# Patient Record
Sex: FEMALE | Race: WHITE | NOT HISPANIC OR LATINO | Employment: OTHER | URBAN - METROPOLITAN AREA
[De-identification: names, ages, dates, MRNs, and addresses within clinical notes are randomized per-mention and may not be internally consistent; named-entity substitution may affect disease eponyms.]

---

## 2022-04-14 ENCOUNTER — TELEPHONE (OUTPATIENT)
Dept: NEUROSURGERY | Facility: CLINIC | Age: 42
End: 2022-04-14

## 2022-04-14 NOTE — TELEPHONE ENCOUNTER
Referral order was received (2 pages). No other records were received. I have tried numerous times to call the referring office to request records.

## 2022-04-14 NOTE — TELEPHONE ENCOUNTER
Caller: Celena Puckett    Relationship to patient: Self    Best call back number: [phone]    Type of visit: NEW PATIENT    Requested date: NEXT AVAILABLE     If rescheduling, when is the original appointment: N/A     Additional notes: PATIENT IS CALLING TO CHECK STATUS OF REFERRAL. I ADVISED I WOULD VERIFY REFERRAL WAS FAXED BY DR DENNIS LEVIN'S OFFICE IN [place] ([phone]).    VERIFIED WITH REFERRING OFFICE THAT REFERRAL WAS FAXED [phone] ON 4/6/22. THEY ARE GOING TO RE-FAX TO MAKE SURE REFERRAL IS RECEIVED.

## 2022-08-03 ENCOUNTER — OFFICE VISIT (OUTPATIENT)
Dept: NEUROSURGERY | Facility: CLINIC | Age: 42
End: 2022-08-03

## 2022-08-03 ENCOUNTER — LAB (OUTPATIENT)
Dept: LAB | Facility: HOSPITAL | Age: 42
End: 2022-08-03

## 2022-08-03 VITALS — BODY MASS INDEX: 35.03 KG/M2 | WEIGHT: 218 LBS | HEIGHT: 66 IN | TEMPERATURE: 97.5 F

## 2022-08-03 DIAGNOSIS — M96.1 LUMBAR POSTLAMINECTOMY SYNDROME: ICD-10-CM

## 2022-08-03 DIAGNOSIS — S31.000A OPEN WOUND OF LUMBAR REGION: ICD-10-CM

## 2022-08-03 DIAGNOSIS — M54.14 THORACIC RADICULOPATHY: Primary | ICD-10-CM

## 2022-08-03 DIAGNOSIS — S31.000A OPEN WOUND OF LUMBAR REGION: Primary | ICD-10-CM

## 2022-08-03 LAB
BASOPHILS # BLD AUTO: 0.08 10*3/MM3 (ref 0–0.2)
BASOPHILS NFR BLD AUTO: 0.8 % (ref 0–1.5)
CRP SERPL-MCNC: 0.6 MG/DL (ref 0–0.5)
DEPRECATED RDW RBC AUTO: 42.3 FL (ref 37–54)
EOSINOPHIL # BLD AUTO: 0.1 10*3/MM3 (ref 0–0.4)
EOSINOPHIL NFR BLD AUTO: 1 % (ref 0.3–6.2)
ERYTHROCYTE [DISTWIDTH] IN BLOOD BY AUTOMATED COUNT: 13.2 % (ref 12.3–15.4)
ERYTHROCYTE [SEDIMENTATION RATE] IN BLOOD: 43 MM/HR (ref 0–20)
HCT VFR BLD AUTO: 41.8 % (ref 34–46.6)
HGB BLD-MCNC: 14 G/DL (ref 12–15.9)
IMM GRANULOCYTES # BLD AUTO: 0.04 10*3/MM3 (ref 0–0.05)
IMM GRANULOCYTES NFR BLD AUTO: 0.4 % (ref 0–0.5)
LYMPHOCYTES # BLD AUTO: 2.62 10*3/MM3 (ref 0.7–3.1)
LYMPHOCYTES NFR BLD AUTO: 25.7 % (ref 19.6–45.3)
MCH RBC QN AUTO: 29.4 PG (ref 26.6–33)
MCHC RBC AUTO-ENTMCNC: 33.5 G/DL (ref 31.5–35.7)
MCV RBC AUTO: 87.8 FL (ref 79–97)
MONOCYTES # BLD AUTO: 1.02 10*3/MM3 (ref 0.1–0.9)
MONOCYTES NFR BLD AUTO: 10 % (ref 5–12)
NEUTROPHILS NFR BLD AUTO: 6.32 10*3/MM3 (ref 1.7–7)
NEUTROPHILS NFR BLD AUTO: 62.1 % (ref 42.7–76)
NRBC BLD AUTO-RTO: 0 /100 WBC (ref 0–0.2)
PLATELET # BLD AUTO: 430 10*3/MM3 (ref 140–450)
PMV BLD AUTO: 8.6 FL (ref 6–12)
RBC # BLD AUTO: 4.76 10*6/MM3 (ref 3.77–5.28)
WBC NRBC COR # BLD: 10.18 10*3/MM3 (ref 3.4–10.8)

## 2022-08-03 PROCEDURE — 86140 C-REACTIVE PROTEIN: CPT

## 2022-08-03 PROCEDURE — 36415 COLL VENOUS BLD VENIPUNCTURE: CPT

## 2022-08-03 PROCEDURE — 85025 COMPLETE CBC W/AUTO DIFF WBC: CPT

## 2022-08-03 PROCEDURE — 85652 RBC SED RATE AUTOMATED: CPT

## 2022-08-03 PROCEDURE — 99204 OFFICE O/P NEW MOD 45 MIN: CPT | Performed by: NEUROLOGICAL SURGERY

## 2022-08-03 RX ORDER — DEXTROAMPHETAMINE SACCHARATE, AMPHETAMINE ASPARTATE MONOHYDRATE, DEXTROAMPHETAMINE SULFATE AND AMPHETAMINE SULFATE 7.5; 7.5; 7.5; 7.5 MG/1; MG/1; MG/1; MG/1
30 CAPSULE, EXTENDED RELEASE ORAL 2 TIMES DAILY
COMMUNITY

## 2022-08-03 RX ORDER — RISPERIDONE 0.5 MG/1
TABLET ORAL
COMMUNITY

## 2022-08-03 RX ORDER — GABAPENTIN 300 MG/1
CAPSULE ORAL
Qty: 90 CAPSULE | Refills: 0 | Status: SHIPPED | OUTPATIENT
Start: 2022-08-03

## 2022-08-03 RX ORDER — ESCITALOPRAM OXALATE 20 MG/1
20 TABLET ORAL DAILY
COMMUNITY

## 2022-08-03 RX ORDER — BUPROPION HYDROCHLORIDE 300 MG/1
TABLET ORAL
COMMUNITY

## 2022-08-03 RX ORDER — LEVOTHYROXINE SODIUM 0.07 MG/1
1 TABLET ORAL DAILY
COMMUNITY
Start: 2022-06-29

## 2022-08-03 NOTE — PROGRESS NOTES
"Patient: Celena Puckett  : 1980    Primary Care Provider: Ga Javier MD    Requesting Provider: As above        History    Chief Complaint: Mid back pain extending into the left flank.    History of Present Illness: Ms. Puckett is a 42-year-old disabled woman with a very complicated medical history.  Reportedly she has had 37 spinal surgeries.  From what I can gather her initial surgery was on her mid back.  Surgeries have been complicated by infection.  She has chronic low back pain.  Her current pain has been present since  involves her bra line and will extend around into the left flank.  The pain is said to be \"fire light.  In the past even before the symptoms arose she has been treated with gabapentin, injections, nerve blocks.  Her current symptoms are worse by the end of the day.  In addition, for the last 3 months or so she has had some drainage from the inferior pole of her lumbar incision.  She denies any fevers or chills.    Review of Systems   Constitutional: Negative for activity change, appetite change, chills, diaphoresis, fatigue, fever and unexpected weight change.   HENT: Negative for congestion, dental problem, drooling, ear discharge, ear pain, facial swelling, hearing loss, mouth sores, nosebleeds, postnasal drip, rhinorrhea, sinus pressure, sinus pain, sneezing, sore throat, tinnitus, trouble swallowing and voice change.    Eyes: Negative for photophobia, pain, discharge, redness, itching and visual disturbance.   Respiratory: Negative for apnea, cough, choking, chest tightness, shortness of breath, wheezing and stridor.    Cardiovascular: Negative for chest pain, palpitations and leg swelling.   Gastrointestinal: Negative for abdominal distention, abdominal pain, anal bleeding, blood in stool, constipation, diarrhea, nausea, rectal pain and vomiting.   Endocrine: Negative for cold intolerance, heat intolerance, polydipsia, polyphagia and polyuria.   Genitourinary: Negative for " "decreased urine volume, difficulty urinating, dyspareunia, dysuria, enuresis, flank pain, frequency, genital sores, hematuria, menstrual problem, pelvic pain, urgency, vaginal bleeding, vaginal discharge and vaginal pain.   Musculoskeletal: Positive for back pain. Negative for arthralgias, gait problem, joint swelling, myalgias, neck pain and neck stiffness.   Skin: Negative for color change, pallor, rash and wound.   Allergic/Immunologic: Negative for environmental allergies, food allergies and immunocompromised state.   Neurological: Negative for dizziness, tremors, seizures, syncope, facial asymmetry, speech difficulty, weakness, light-headedness, numbness and headaches.   Hematological: Negative for adenopathy. Does not bruise/bleed easily.   Psychiatric/Behavioral: Negative for agitation, behavioral problems, confusion, decreased concentration, dysphoric mood, hallucinations, self-injury, sleep disturbance and suicidal ideas. The patient is not nervous/anxious and is not hyperactive.        The patient's past medical history, past surgical history, family history, and social history have been reviewed at length in the electronic medical record.      Physical Exam:   Temp 97.5 °F (36.4 °C)   Ht 167.6 cm (66\")   Wt 98.9 kg (218 lb)   BMI 35.19 kg/m²   CONSTITUTIONAL: Patient is well-nourished, pleasant and appears stated age.  MUSCULOSKELETAL:  Straight leg raising is negative.  Mynor's Sign is negative.  ROM in the low back is normal.  Tenderness in the back to palpation is not observed.  Her lumbar incision is somewhat of a deep crevice.  At the inferior aspect there are couple of openings to suggest a possible fistula.  NEUROLOGICAL:  Orientation, memory, attention span, language function, and cognition have been examined and are intact.  Strength is intact in the lower extremities to direct testing.  Muscle tone is normal throughout.  Station and gait are normal.  Sensation is intact to light touch " testing throughout.  Deep tendon reflexes are 1+ and symmetrical.  Coordination is intact.      Medical Decision Making    Data Review:   (All imaging studies were personally reviewed unless stated otherwise)  MRI of the thoracic spine dated 7/7/2022 demonstrates hardware artifact.  She may have hardware construct from approximately T7-T9.  I do not see clear nerve root compromise on the right.    MRI of the lumbar spine demonstrates hardware in place at L5-S1.  There is probably a fluid collection dorsal to the spine at this level.  The study is only noncontrasted.    Diagnosis:   1.  Thoracic radiculopathy.  2.  Lumbar postlaminectomy syndrome.  3.  Potential indolent spinal infection.    Treatment Options:   I prescribed gabapentin.  I am going to check some inflammatory markers.  I am also going to check plain films of her thoracic and lumbar spine to assess her construct and to see whether there is any hardware loosening.  I am also going to check an MRI of her lumbar spine with and without gadolinium to better define what may be infection.  She will follow-up thereafter.       Diagnosis Plan   1. Thoracic radiculopathy     2. Lumbar postlaminectomy syndrome     3. Open wound of lumbar region         Scribed for Jose Medrano MD by KORY Serrano 8/3/2022 17:56 EDT      I, Dr. Medrano, personally performed the services described in the documentation, as scribed in my presence, and it is both accurate and complete.

## 2022-08-11 ENCOUNTER — TELEPHONE (OUTPATIENT)
Dept: NEUROSURGERY | Facility: CLINIC | Age: 42
End: 2022-08-11

## 2022-08-11 NOTE — TELEPHONE ENCOUNTER
MRI [facility]bar [facility] Thoracic [facility] from 07/07/2022 loaded into PACS. Disc placed into outgoing mail for the patient.

## 2022-08-19 ENCOUNTER — APPOINTMENT (OUTPATIENT)
Dept: MRI IMAGING | Facility: HOSPITAL | Age: 42
End: 2022-08-19

## 2022-09-22 ENCOUNTER — TELEPHONE (OUTPATIENT)
Dept: NEUROSURGERY | Facility: CLINIC | Age: 42
End: 2022-09-22

## 2022-09-22 NOTE — TELEPHONE ENCOUNTER
Provider:  Mitchell  Surgery/Procedure: TAPAN   Surgery/Procedure Date: NA   Last visit: Office Visit with Jose Medrano MD (08/03/2022)    Next visit: 09/30/2022     Reason for call:     Patient LVM sound stressed that she woke up this morning covered in blood. She states that the blood looks like it is coming from an old surgical site. She wants to know what she needs to do. Routing high priority due to urgency of call.       I s/w patient and she states that she woke up this morning and her whole back was wet and she has an old incision site that has been staying infected. Dr. Medrano told her at the last OV visit that he saw what was maybe a sack of fluid close to it. Patient states that she had to sit on the toilet because the blood was pouring out. Patient states that it is not bleeding as much but it is still bleeding.

## 2022-09-22 NOTE — TELEPHONE ENCOUNTER
Patient has seen Dr. Medrano in the office, but we have not done any of her previous (reportedly multiple) back surgeries. When and where was her most recent surgery?   Dr. Barr in [facility] 2018 (?) not sure when. Thinks it was a fusion    Ana was talking to her. I told her to relate that she needs to be seen soon to be evaluated for the blood loss and to have labs done. This should be done by her PCP or nearest ER if she is still actively bleeding. If the labs indicate infection, we will follow up with her subsequently, but she needs to be seen today if she is still bleeding.

## 2022-09-22 NOTE — TELEPHONE ENCOUNTER
Attempted to contact patient to see if she was able to reach her PCP or go the ER. No answer. LVM asking her to give us an update in the morning and provided a call back number.

## 2022-09-22 NOTE — TELEPHONE ENCOUNTER
S/w patient and relayed PA's message.    Patient verbalized understanding and states that she will try to reach out to her PCP. Patient agrees that if she is actively bleeding she will go to the ER. Patient states that she took off her bandage and she was only bleeding a little but. I asked patient when she goes to the PCP/ER to please have them fax the reports, notes, lab results to our office so we are updated on the situation. Patient voiced understanding and was thankful for the call back

## 2022-09-23 NOTE — TELEPHONE ENCOUNTER
Attempted to contact patient to gather an update on her symptoms but no answer. LVM to call us back with an update.

## 2022-09-23 NOTE — TELEPHONE ENCOUNTER
Pt LVM returning Ana's call regarding an update.   Spoke with pt regarding update. Pt stated it is still bleeding a little but not as bad as yesterday. She stated that it looks like it has fluid along with blood. Asked pt if she was able to get in to see her PCP today. Pt stated her PCP's office is closed on Fridays. Pt denies any fever,chills, redness, or green discharge coming from wound. Instructed pt to go to Urgent Care to have cultures done since it is still bleeding and she is noticing some discharge. Pt verbalized understanding and was thankful for the return call.

## 2022-09-29 ENCOUNTER — TELEPHONE (OUTPATIENT)
Dept: NEUROSURGERY | Facility: CLINIC | Age: 42
End: 2022-09-29

## 2022-09-29 NOTE — TELEPHONE ENCOUNTER
Provider:  Mitchell  Surgery/Procedure: TAPAN   Surgery/Procedure Date:  NA  Last visit:Office Visit with Jose Medrano MD (08/03/2022)     Next visit: Tomorrow 2:20     Reason for call:     Patient LVM stating that she had a questions regarding scheduling.

## 2022-09-30 ENCOUNTER — HOSPITAL ENCOUNTER (OUTPATIENT)
Dept: GENERAL RADIOLOGY | Facility: HOSPITAL | Age: 42
Discharge: HOME | End: 2022-09-30

## 2022-09-30 ENCOUNTER — OFFICE VISIT (OUTPATIENT)
Dept: NEUROSURGERY | Facility: CLINIC | Age: 42
End: 2022-09-30

## 2022-09-30 ENCOUNTER — HOSPITAL ENCOUNTER (OUTPATIENT)
Dept: MRI IMAGING | Facility: HOSPITAL | Age: 42
Discharge: HOME | End: 2022-09-30

## 2022-09-30 VITALS — WEIGHT: 226.2 LBS | TEMPERATURE: 97.1 F | BODY MASS INDEX: 36.35 KG/M2 | HEIGHT: 66 IN

## 2022-09-30 DIAGNOSIS — Z98.1 HISTORY OF LUMBAR SPINAL FUSION: Primary | ICD-10-CM

## 2022-09-30 DIAGNOSIS — M96.1 LUMBAR POSTLAMINECTOMY SYNDROME: ICD-10-CM

## 2022-09-30 PROCEDURE — 72100 X-RAY EXAM L-S SPINE 2/3 VWS: CPT

## 2022-09-30 PROCEDURE — A9577 INJ MULTIHANCE: HCPCS | Performed by: NEUROLOGICAL SURGERY

## 2022-09-30 PROCEDURE — 99213 OFFICE O/P EST LOW 20 MIN: CPT | Performed by: NEUROLOGICAL SURGERY

## 2022-09-30 PROCEDURE — 72158 MRI LUMBAR SPINE W/O & W/DYE: CPT

## 2022-09-30 PROCEDURE — 0 GADOBENATE DIMEGLUMINE 529 MG/ML SOLUTION: Performed by: NEUROLOGICAL SURGERY

## 2022-09-30 PROCEDURE — 72070 X-RAY EXAM THORAC SPINE 2VWS: CPT

## 2022-09-30 RX ORDER — CLONIDINE HYDROCHLORIDE 0.3 MG/1
0.3 TABLET ORAL
COMMUNITY
Start: 2022-09-09

## 2022-09-30 RX ORDER — RISPERIDONE 3 MG/1
3 TABLET ORAL 2 TIMES DAILY
COMMUNITY
Start: 2022-08-29

## 2022-09-30 RX ORDER — DIAZEPAM 5 MG/1
TABLET ORAL
COMMUNITY
Start: 2022-08-18

## 2022-09-30 RX ADMIN — GADOBENATE DIMEGLUMINE 20 ML: 529 INJECTION, SOLUTION INTRAVENOUS at 11:01

## 2022-09-30 NOTE — PROGRESS NOTES
"Patient: Blossom Puckett  : 1980    Primary Care Provider: Ga Javier MD    Requesting Provider: As above        History    Chief Complaint:   1.  Mid back pain extending into the left flank.  2.  Lumbar wound drainage.    History of Present Illness: Ms. Puckett is a 42-year-old disabled woman with a very complicated medical history.  Reportedly she has had 37 spinal surgeries.  From what I can gather her initial surgery was on her mid back.  Most of her surgeries were done in [place].  We have some records indicating that she had a mid back surgery to explore her fusion in 2019 in [place], [place].  She thinks that her last low back surgery was 2018 or earlier. Surgeries have been complicated by infection.  She has chronic low back pain.  Her current pain has been present since  involves her bra line and will extend around into the left flank.  The pain is said to be \"fire-like.\"  In the past even before the symptoms arose she has been treated with gabapentin, injections, nerve blocks.  Her current symptoms are worse by the end of the day.  When seen last she indicated she had some drainage from the lower pole of her lumbar incision for 3 months.  She now says that dates back to January or even earlier.  Since I saw her last she has had some further drainage that sometimes is more purulent and other times more bloody.    Review of Systems   Constitutional: Positive for fatigue. Negative for activity change, appetite change, chills, diaphoresis, fever and unexpected weight change.   HENT: Negative for congestion, dental problem, drooling, ear discharge, ear pain, facial swelling, hearing loss, mouth sores, nosebleeds, postnasal drip, rhinorrhea, sinus pressure, sinus pain, sneezing, sore throat, tinnitus, trouble swallowing and voice change.    Eyes: Negative for photophobia, pain, discharge, redness, itching and visual disturbance.   Respiratory: Negative for apnea, cough, choking, chest " "tightness, shortness of breath, wheezing and stridor.    Cardiovascular: Negative for chest pain, palpitations and leg swelling.   Gastrointestinal: Positive for constipation. Negative for abdominal distention, abdominal pain, anal bleeding, blood in stool, diarrhea, nausea, rectal pain and vomiting.   Endocrine: Positive for polydipsia. Negative for cold intolerance, heat intolerance, polyphagia and polyuria.   Genitourinary: Positive for flank pain. Negative for decreased urine volume, difficulty urinating, dyspareunia, dysuria, enuresis, frequency, genital sores, hematuria, menstrual problem, pelvic pain, urgency, vaginal bleeding, vaginal discharge and vaginal pain.   Musculoskeletal: Positive for back pain and myalgias. Negative for arthralgias, gait problem, joint swelling, neck pain and neck stiffness.   Skin: Negative for color change, pallor, rash and wound.   Allergic/Immunologic: Negative for environmental allergies, food allergies and immunocompromised state.   Neurological: Positive for dizziness, weakness, light-headedness, numbness and headaches. Negative for tremors, seizures, syncope, facial asymmetry and speech difficulty.   Hematological: Negative for adenopathy. Does not bruise/bleed easily.   Psychiatric/Behavioral: Positive for confusion and dysphoric mood. Negative for agitation, behavioral problems, decreased concentration, hallucinations, self-injury, sleep disturbance and suicidal ideas. The patient is nervous/anxious. The patient is not hyperactive.        The patient's past medical history, past surgical history, family history, and social history have been reviewed at length in the electronic medical record.      Physical Exam:   Temp 97.1 °F (36.2 °C) (Infrared)   Ht 167.6 cm (66\")   Wt 103 kg (226 lb 3.2 oz)   BMI 36.51 kg/m²   On examination there is a tiny opening at the lower pole of her lumbar incision.  There is no erythema or active drainage.    Medical Decision Making    Data " Review:   (All imaging studies were personally reviewed unless stated otherwise)  An MRI of the lumbar spine demonstrates postsurgical changes related to L5-S1 fusion.  There is a tiny pocket of fluid as the radiologist phrases it correction between the SI joint on the right and the laminectomy bed.  There is no significant enhancement.  This is a small fluid collection.     Plain films of the thoracic spine demonstrate pedicle screw hardware from T7- 10 with a metallic interbody spacer at T8-9.    Lumbar plain films demonstrate pedicle screw hardware at L5-S1 with a metallic interbody spacer.  I cannot determine whether there is fusion or not given the metallic artifact.    CRP: 0.60  ESR: 43  WBC: 10.18    Diagnosis:   1.  Chronic thoracic pain with some radicular symptoms.  2.  Lumbar wound drainage remote from a surgical intervention.    Treatment Options:   The patient likely has a low-grade indolent lumbar infection.  I am going to refer to infectious disease for evaluation.  I could potentially remove the pedicle screw hardware.  I am not enthusiastic about that given that I am not sure whether she is fused or not.  However even if we are inclined to remove hardware I could not remove the metallic interbody spacer.  Management of this ongoing low-grade infection is probably going to be difficult.  She is going to try and obtain some old records from her multiple hospitalizations in [place].  She will follow-up with the above.       Diagnosis Plan   1. History of lumbar spinal fusion         Scribed for Jose Medrano MD by Yvette Henry FRANCISCO 9/30/2022 15:33 EDT      I, Dr. Medrano, personally performed the services described in the documentation, as scribed in my presence, and it is both accurate and complete.